# Patient Record
Sex: MALE | Race: WHITE | NOT HISPANIC OR LATINO | Employment: FULL TIME | URBAN - METROPOLITAN AREA
[De-identification: names, ages, dates, MRNs, and addresses within clinical notes are randomized per-mention and may not be internally consistent; named-entity substitution may affect disease eponyms.]

---

## 2024-01-08 ENCOUNTER — OFFICE VISIT (OUTPATIENT)
Dept: URGENT CARE | Facility: CLINIC | Age: 29
End: 2024-01-08
Payer: COMMERCIAL

## 2024-01-08 VITALS
RESPIRATION RATE: 16 BRPM | BODY MASS INDEX: 26.6 KG/M2 | HEIGHT: 71 IN | SYSTOLIC BLOOD PRESSURE: 139 MMHG | OXYGEN SATURATION: 97 % | TEMPERATURE: 98 F | DIASTOLIC BLOOD PRESSURE: 89 MMHG | HEART RATE: 97 BPM | WEIGHT: 190 LBS

## 2024-01-08 DIAGNOSIS — T78.40XA ALLERGIC REACTION, INITIAL ENCOUNTER: Primary | ICD-10-CM

## 2024-01-08 DIAGNOSIS — R60.0 PERIORBITAL EDEMA OF BOTH EYES: ICD-10-CM

## 2024-01-08 PROCEDURE — 99203 OFFICE O/P NEW LOW 30 MIN: CPT | Mod: 25,S$GLB,, | Performed by: STUDENT IN AN ORGANIZED HEALTH CARE EDUCATION/TRAINING PROGRAM

## 2024-01-08 PROCEDURE — 96372 THER/PROPH/DIAG INJ SC/IM: CPT | Mod: S$GLB,,, | Performed by: STUDENT IN AN ORGANIZED HEALTH CARE EDUCATION/TRAINING PROGRAM

## 2024-01-08 RX ORDER — METHYLPREDNISOLONE 4 MG/1
TABLET ORAL
Qty: 21 EACH | Refills: 0 | Status: SHIPPED | OUTPATIENT
Start: 2024-01-08

## 2024-01-08 RX ORDER — METHYLPREDNISOLONE ACETATE 80 MG/ML
80 INJECTION, SUSPENSION INTRA-ARTICULAR; INTRALESIONAL; INTRAMUSCULAR; SOFT TISSUE
Status: COMPLETED | OUTPATIENT
Start: 2024-01-08 | End: 2024-01-08

## 2024-01-08 RX ADMIN — METHYLPREDNISOLONE ACETATE 80 MG: 80 INJECTION, SUSPENSION INTRA-ARTICULAR; INTRALESIONAL; INTRAMUSCULAR; SOFT TISSUE at 09:01

## 2024-01-08 NOTE — PATIENT INSTRUCTIONS
Please follow up with your primary care provider within 2-5 days if your signs and symptoms have not resolved or worsen.     If your condition worsens or fails to improve we recommend that you receive another evaluation at the emergency room immediately or contact your primary medical clinic to discuss your concerns.   You must understand that you have received an Urgent Care treatment only and that you may be released before all of your medical problems are known or treated. You, the patient, will arrange for follow up care as instructed.        If you develop any fever, chills, eye discharge, pain with eye movement, blurry vision, worsening eye swelling then please go to the ER.

## 2024-01-08 NOTE — PROGRESS NOTES
"Subjective:      Patient ID: Mark Doshi is a 28 y.o. male.    Vitals:  height is 5' 11" (1.803 m) and weight is 86.2 kg (190 lb). His oral temperature is 97.7 °F (36.5 °C). His blood pressure is 139/89 and his pulse is 97. His respiration is 16 and oxygen saturation is 97%.     Chief Complaint: Eye Problem (Swollen right eye)    Patient presents today with right eye swollen. Patient states that he noticed yesterday evening that it was a little swollen but woke up to both eyes swollen. Right worse than left. Patient states there was a little liquid came out of right eye, no itching. Does not hurt. No pus coming out of his eye. No allergies. No new foods. He was out Saturday and he was around a lot of dirt and debris. He thinks maybe something got into his eyes. No blurry vision in the right eye.     Eye Problem   Both eyes are affected. This is a new problem. The current episode started yesterday. The problem occurs constantly. The problem has been gradually worsening. The pain is at a severity of 0/10. The patient is experiencing no pain. There is No known exposure to pink eye. He Does not wear contacts. Associated symptoms include an eye discharge (tearing), eye redness and itching. Pertinent negatives include no blurred vision, double vision, fever or photophobia. He has tried nothing for the symptoms.       Constitution: Negative for fever.   Cardiovascular:  Negative for chest pain.   Eyes:  Positive for eye discharge (tearing), eye itching, eye redness and eyelid swelling. Negative for eye trauma, eye pain, photophobia, vision loss, double vision and blurred vision.   Respiratory:  Negative for shortness of breath.    Skin:  Negative for rash.      Objective:     Physical Exam   Eyes: Right eye exhibits no discharge and no exudate. No foreign body present in the right eye. Left eye exhibits no discharge and no exudate. No foreign body present in the left eye. Right conjunctiva has no hemorrhage. Left " conjunctiva has no hemorrhage. Right eye exhibits normal extraocular motion and no nystagmus. Left eye exhibits normal extraocular motion and no nystagmus. Extraocular movement intact gaze aligned appropriately periorbital hyperpigmentation     Comments: R eye Swelling and redness on of the upper and lower eyelid  Swelling along the lower left eye lid  No pain with movements of the eye lids  No tenderness to palpation of the swollen areas around both eyes       Assessment:     1. Allergic reaction, initial encounter    2. Periorbital edema of both eyes        Plan:       Allergic reaction, initial encounter  -     methylPREDNISolone acetate injection 80 mg  -     methylPREDNISolone (MEDROL DOSEPACK) 4 mg tablet; use as directed, please start tomorrow  Dispense: 21 each; Refill: 0    Periorbital edema of both eyes  -     methylPREDNISolone acetate injection 80 mg  -     methylPREDNISolone (MEDROL DOSEPACK) 4 mg tablet; use as directed, please start tomorrow  Dispense: 21 each; Refill: 0      Please follow up with your primary care provider within 2-5 days if your signs and symptoms have not resolved or worsen.     If your condition worsens or fails to improve we recommend that you receive another evaluation at the emergency room immediately or contact your primary medical clinic to discuss your concerns.   You must understand that you have received an Urgent Care treatment only and that you may be released before all of your medical problems are known or treated. You, the patient, will arrange for follow up care as instructed.        If you develop any fever, chills, eye discharge, pain with eye movement, blurry vision, worsening eye swelling then please go to the ER.     Medical Decision Making:   Differential Diagnosis:   Periorbital edema 2/2 to an allergic reaction  Urgent Care Management:  Patient presents today with right eye swollen. Patient states that he noticed yesterday evening that it was a little swollen  but woke up to both eyes swollen. Right worse than left. Patient states there was a little liquid came out of right eye, no itching. Does not hurt. No pus coming out of his eye. No allergies. No new foods. He was out Saturday and he was around a lot of dirt and debris. He thinks maybe something got into his eyes. No blurry vision in the right eye.   Eyes: Right eye exhibits no discharge and no exudate. No foreign body present in the right eye. Left eye exhibits no discharge and no exudate. No foreign body present in the left eye. Right conjunctiva has no hemorrhage. Left conjunctiva has no hemorrhage. Right eye exhibits normal extraocular motion and no nystagmus. Left eye exhibits normal extraocular motion and no nystagmus. Extraocular movement intact gaze aligned appropriately periorbital hyperpigmentation     Comments: R eye Swelling and redness on of the upper and lower eyelid  Swelling along the lower left eye lid  No pain with movements of the eye lids    The pt was given a dose of steroids in office and was sent home with a dose pack to start tomorrow. I counseled the patient on signs and symptoms of orbital and preseptal cellulitis. My suspension for this is low due to no eye pain with movement, no blurry vision, the eyes are not tender to palpation, no fever, no discharge.  If the patient does happen to have the infection he was been told to go to the ER. ED and return precautions were given. The pt kavon